# Patient Record
Sex: MALE | Race: WHITE | Employment: OTHER | URBAN - METROPOLITAN AREA
[De-identification: names, ages, dates, MRNs, and addresses within clinical notes are randomized per-mention and may not be internally consistent; named-entity substitution may affect disease eponyms.]

---

## 2017-02-02 ENCOUNTER — OFFICE VISIT (OUTPATIENT)
Dept: FAMILY MEDICINE CLINIC | Age: 72
End: 2017-02-02

## 2017-02-02 VITALS
DIASTOLIC BLOOD PRESSURE: 76 MMHG | HEART RATE: 60 BPM | HEIGHT: 68 IN | WEIGHT: 207.3 LBS | RESPIRATION RATE: 16 BRPM | BODY MASS INDEX: 31.42 KG/M2 | SYSTOLIC BLOOD PRESSURE: 122 MMHG

## 2017-02-02 DIAGNOSIS — G89.29 CHRONIC MIDLINE LOW BACK PAIN WITHOUT SCIATICA: ICD-10-CM

## 2017-02-02 DIAGNOSIS — I10 ESSENTIAL HYPERTENSION: Primary | ICD-10-CM

## 2017-02-02 DIAGNOSIS — Z51.81 MEDICATION MONITORING ENCOUNTER: ICD-10-CM

## 2017-02-02 DIAGNOSIS — E78.00 HYPERCHOLESTEROLEMIA: ICD-10-CM

## 2017-02-02 DIAGNOSIS — M54.50 CHRONIC MIDLINE LOW BACK PAIN WITHOUT SCIATICA: ICD-10-CM

## 2017-02-02 DIAGNOSIS — K12.0 CANKER SORES ORAL: ICD-10-CM

## 2017-02-02 PROCEDURE — 4040F PNEUMOC VAC/ADMIN/RCVD: CPT | Performed by: FAMILY MEDICINE

## 2017-02-02 PROCEDURE — 1123F ACP DISCUSS/DSCN MKR DOCD: CPT | Performed by: FAMILY MEDICINE

## 2017-02-02 PROCEDURE — G8427 DOCREV CUR MEDS BY ELIG CLIN: HCPCS | Performed by: FAMILY MEDICINE

## 2017-02-02 PROCEDURE — 99214 OFFICE O/P EST MOD 30 MIN: CPT | Performed by: FAMILY MEDICINE

## 2017-02-02 PROCEDURE — 3017F COLORECTAL CA SCREEN DOC REV: CPT | Performed by: FAMILY MEDICINE

## 2017-02-02 PROCEDURE — G8484 FLU IMMUNIZE NO ADMIN: HCPCS | Performed by: FAMILY MEDICINE

## 2017-02-02 PROCEDURE — G8419 CALC BMI OUT NRM PARAM NOF/U: HCPCS | Performed by: FAMILY MEDICINE

## 2017-02-02 PROCEDURE — 1036F TOBACCO NON-USER: CPT | Performed by: FAMILY MEDICINE

## 2017-02-02 RX ORDER — ATORVASTATIN CALCIUM 10 MG/1
TABLET, FILM COATED ORAL
Qty: 90 TABLET | Refills: 3 | Status: SHIPPED | OUTPATIENT
Start: 2017-02-02 | End: 2018-02-05 | Stop reason: SDUPTHER

## 2017-02-02 RX ORDER — LISINOPRIL AND HYDROCHLOROTHIAZIDE 25; 20 MG/1; MG/1
TABLET ORAL
Qty: 90 TABLET | Refills: 2 | Status: CANCELLED | OUTPATIENT
Start: 2017-02-02

## 2017-02-02 RX ORDER — LISINOPRIL AND HYDROCHLOROTHIAZIDE 12.5; 1 MG/1; MG/1
1 TABLET ORAL DAILY
Qty: 90 TABLET | Refills: 3 | Status: SHIPPED | OUTPATIENT
Start: 2017-02-02 | End: 2017-12-30 | Stop reason: SDUPTHER

## 2017-02-02 RX ORDER — CHLORHEXIDINE GLUCONATE 0.12 MG/ML
15 RINSE ORAL 2 TIMES DAILY
Qty: 473 ML | Refills: 1 | Status: SHIPPED | OUTPATIENT
Start: 2017-02-02 | End: 2018-02-28 | Stop reason: SDUPTHER

## 2017-02-02 ASSESSMENT — ENCOUNTER SYMPTOMS
COUGH: 0
ALLERGIC/IMMUNOLOGIC NEGATIVE: 1
BACK PAIN: 1
RESPIRATORY NEGATIVE: 1
ROS SKIN COMMENTS: SEE HPI.
GASTROINTESTINAL NEGATIVE: 1

## 2017-02-02 ASSESSMENT — PATIENT HEALTH QUESTIONNAIRE - PHQ9
1. LITTLE INTEREST OR PLEASURE IN DOING THINGS: 0
2. FEELING DOWN, DEPRESSED OR HOPELESS: 0
SUM OF ALL RESPONSES TO PHQ QUESTIONS 1-9: 0
SUM OF ALL RESPONSES TO PHQ9 QUESTIONS 1 & 2: 0

## 2017-03-22 ENCOUNTER — OFFICE VISIT (OUTPATIENT)
Dept: AUDIOLOGY | Age: 72
End: 2017-03-22

## 2017-03-22 DIAGNOSIS — H90.3 SENSORINEURAL HEARING LOSS, BILATERAL: Primary | ICD-10-CM

## 2017-05-09 ENCOUNTER — TELEPHONE (OUTPATIENT)
Dept: FAMILY MEDICINE CLINIC | Age: 72
End: 2017-05-09

## 2017-05-09 DIAGNOSIS — M72.2 PLANTAR FASCIITIS, BILATERAL: Primary | ICD-10-CM

## 2017-07-14 ENCOUNTER — OFFICE VISIT (OUTPATIENT)
Dept: AUDIOLOGY | Age: 72
End: 2017-07-14

## 2017-07-14 DIAGNOSIS — H91.93 BILATERAL HEARING LOSS: Primary | ICD-10-CM

## 2017-08-29 ENCOUNTER — OFFICE VISIT (OUTPATIENT)
Dept: FAMILY MEDICINE CLINIC | Age: 72
End: 2017-08-29
Payer: MEDICARE

## 2017-08-29 VITALS
DIASTOLIC BLOOD PRESSURE: 58 MMHG | RESPIRATION RATE: 16 BRPM | WEIGHT: 204.6 LBS | SYSTOLIC BLOOD PRESSURE: 112 MMHG | HEART RATE: 64 BPM | HEIGHT: 68 IN | BODY MASS INDEX: 31.01 KG/M2

## 2017-08-29 DIAGNOSIS — Z00.00 ROUTINE GENERAL MEDICAL EXAMINATION AT A HEALTH CARE FACILITY: Primary | ICD-10-CM

## 2017-08-29 DIAGNOSIS — Z23 NEED FOR PROPHYLACTIC VACCINATION AGAINST STREPTOCOCCUS PNEUMONIAE (PNEUMOCOCCUS): ICD-10-CM

## 2017-08-29 PROCEDURE — 90670 PCV13 VACCINE IM: CPT | Performed by: FAMILY MEDICINE

## 2017-08-29 PROCEDURE — G0009 ADMIN PNEUMOCOCCAL VACCINE: HCPCS | Performed by: FAMILY MEDICINE

## 2017-08-29 PROCEDURE — G0438 PPPS, INITIAL VISIT: HCPCS | Performed by: FAMILY MEDICINE

## 2017-08-29 ASSESSMENT — PATIENT HEALTH QUESTIONNAIRE - PHQ9: SUM OF ALL RESPONSES TO PHQ QUESTIONS 1-9: 0

## 2017-08-29 ASSESSMENT — LIFESTYLE VARIABLES
HOW OFTEN DURING THE LAST YEAR HAVE YOU HAD A FEELING OF GUILT OR REMORSE AFTER DRINKING: 0
HAVE YOU OR SOMEONE ELSE BEEN INJURED AS A RESULT OF YOUR DRINKING: 0
HOW MANY STANDARD DRINKS CONTAINING ALCOHOL DO YOU HAVE ON A TYPICAL DAY: 0
AUDIT-C TOTAL SCORE: 2
HOW OFTEN DURING THE LAST YEAR HAVE YOU BEEN UNABLE TO REMEMBER WHAT HAPPENED THE NIGHT BEFORE BECAUSE YOU HAD BEEN DRINKING: 0
HOW OFTEN DURING THE LAST YEAR HAVE YOU NEEDED AN ALCOHOLIC DRINK FIRST THING IN THE MORNING TO GET YOURSELF GOING AFTER A NIGHT OF HEAVY DRINKING: 0
HAS A RELATIVE, FRIEND, DOCTOR, OR ANOTHER HEALTH PROFESSIONAL EXPRESSED CONCERN ABOUT YOUR DRINKING OR SUGGESTED YOU CUT DOWN: 0
HOW OFTEN DO YOU HAVE A DRINK CONTAINING ALCOHOL: 2
HOW OFTEN DURING THE LAST YEAR HAVE YOU FAILED TO DO WHAT WAS NORMALLY EXPECTED FROM YOU BECAUSE OF DRINKING: 0
HOW OFTEN DO YOU HAVE SIX OR MORE DRINKS ON ONE OCCASION: 0
AUDIT TOTAL SCORE: 2
HOW OFTEN DURING THE LAST YEAR HAVE YOU FOUND THAT YOU WERE NOT ABLE TO STOP DRINKING ONCE YOU HAD STARTED: 0

## 2017-08-29 ASSESSMENT — ANXIETY QUESTIONNAIRES: GAD7 TOTAL SCORE: 0

## 2017-12-30 DIAGNOSIS — I10 ESSENTIAL HYPERTENSION: ICD-10-CM

## 2018-01-02 RX ORDER — LISINOPRIL AND HYDROCHLOROTHIAZIDE 12.5; 1 MG/1; MG/1
TABLET ORAL
Qty: 90 TABLET | Refills: 2 | Status: SHIPPED | OUTPATIENT
Start: 2018-01-02 | End: 2018-08-04 | Stop reason: SDUPTHER

## 2018-02-01 ENCOUNTER — HOSPITAL ENCOUNTER (OUTPATIENT)
Dept: AUDIOLOGY | Age: 73
Discharge: HOME | End: 2018-02-01

## 2018-02-01 PROCEDURE — V5267 HEARING AID SUP/ACCESS/DEV: HCPCS | Performed by: AUDIOLOGIST

## 2018-02-05 DIAGNOSIS — E78.00 HYPERCHOLESTEROLEMIA: ICD-10-CM

## 2018-02-05 RX ORDER — ATORVASTATIN CALCIUM 10 MG/1
TABLET, FILM COATED ORAL
Qty: 30 TABLET | Refills: 2 | Status: SHIPPED | OUTPATIENT
Start: 2018-02-05 | End: 2018-05-06 | Stop reason: SDUPTHER

## 2018-02-28 ENCOUNTER — OFFICE VISIT (OUTPATIENT)
Dept: FAMILY MEDICINE CLINIC | Age: 73
End: 2018-02-28
Payer: MEDICARE

## 2018-02-28 VITALS
RESPIRATION RATE: 16 BRPM | SYSTOLIC BLOOD PRESSURE: 114 MMHG | HEART RATE: 58 BPM | WEIGHT: 206.8 LBS | TEMPERATURE: 97.6 F | HEIGHT: 68 IN | BODY MASS INDEX: 31.34 KG/M2 | DIASTOLIC BLOOD PRESSURE: 70 MMHG

## 2018-02-28 DIAGNOSIS — N52.01 ERECTILE DYSFUNCTION DUE TO ARTERIAL INSUFFICIENCY: ICD-10-CM

## 2018-02-28 DIAGNOSIS — K12.0 CANKER SORES ORAL: ICD-10-CM

## 2018-02-28 DIAGNOSIS — I10 ESSENTIAL HYPERTENSION: Primary | ICD-10-CM

## 2018-02-28 DIAGNOSIS — E78.00 HYPERCHOLESTEROLEMIA: ICD-10-CM

## 2018-02-28 PROCEDURE — G8427 DOCREV CUR MEDS BY ELIG CLIN: HCPCS | Performed by: FAMILY MEDICINE

## 2018-02-28 PROCEDURE — G8417 CALC BMI ABV UP PARAM F/U: HCPCS | Performed by: FAMILY MEDICINE

## 2018-02-28 PROCEDURE — 1036F TOBACCO NON-USER: CPT | Performed by: FAMILY MEDICINE

## 2018-02-28 PROCEDURE — 1123F ACP DISCUSS/DSCN MKR DOCD: CPT | Performed by: FAMILY MEDICINE

## 2018-02-28 PROCEDURE — 3017F COLORECTAL CA SCREEN DOC REV: CPT | Performed by: FAMILY MEDICINE

## 2018-02-28 PROCEDURE — 4040F PNEUMOC VAC/ADMIN/RCVD: CPT | Performed by: FAMILY MEDICINE

## 2018-02-28 PROCEDURE — 99214 OFFICE O/P EST MOD 30 MIN: CPT | Performed by: FAMILY MEDICINE

## 2018-02-28 PROCEDURE — G8484 FLU IMMUNIZE NO ADMIN: HCPCS | Performed by: FAMILY MEDICINE

## 2018-02-28 RX ORDER — CHLORHEXIDINE GLUCONATE 0.12 MG/ML
15 RINSE ORAL 2 TIMES DAILY
Qty: 473 ML | Refills: 1 | Status: SHIPPED | OUTPATIENT
Start: 2018-02-28 | End: 2018-08-29 | Stop reason: SDUPTHER

## 2018-02-28 ASSESSMENT — ENCOUNTER SYMPTOMS
ALLERGIC/IMMUNOLOGIC NEGATIVE: 1
GASTROINTESTINAL NEGATIVE: 1
BACK PAIN: 0
RESPIRATORY NEGATIVE: 1

## 2018-02-28 NOTE — PROGRESS NOTES
last one 2014    SKIN BIOPSY      SKIN CANCER EXCISION  11/12/12    LT SIDE OF NOSE    SKIN CANCER EXCISION  04/01/2013    BASAL CELL RT. 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  Efforts were made to edit the dictations but occasionally words are mis-transcribed. Review of Systems   Constitutional: Negative. HENT: Negative. Respiratory: Negative. Cardiovascular: Negative. Gastrointestinal: Negative. Endocrine: Negative. Genitourinary: Negative. Negative for difficulty urinating. Musculoskeletal: Negative for arthralgias, back pain and gait problem. Skin: Negative. Allergic/Immunologic: Negative. Neurological: Negative. Negative for dizziness, weakness, light-headedness and headaches. Hematological: Negative. Psychiatric/Behavioral: Negative. All other systems reviewed and are negative. Objective:   Physical Exam   Constitutional: He is oriented to person, place, and time. He appears well-developed and well-nourished. HENT:   Right Ear: External ear normal.   Left Ear: External ear normal.   Nose: Nose normal.   Mouth/Throat: Oropharynx is clear and moist.   Eyes: Conjunctivae are normal.   Neck: No thyromegaly present. Cardiovascular: Normal rate, regular rhythm, normal heart sounds and intact distal pulses. Exam reveals no gallop. No murmur heard. Pulmonary/Chest: Effort normal and breath sounds normal. He has no wheezes. He has no rales. Abdominal: Soft. Bowel sounds are normal.   Musculoskeletal: He exhibits no edema. Lymphadenopathy:     He has no cervical adenopathy. Neurological: He is alert and oriented to person, place, and time. No cranial nerve deficit. Skin: Skin is warm and dry. No rash noted. Psychiatric: He has a normal mood and affect. Nursing note and vitals reviewed. Assessment:      1. patient does not have a history of falls. I did , complete a risk assessment for falls.  A plan of care for falls No Treatment plan indicated    Lab Results   Component Value Date    LDLCALC 68 05/06/2017    (goal LDL reduction with dx if diabetes is 50% LDL reduction)    PHQ Scores 8/29/2017 2/2/2017 5/26/2015   PHQ2 Score 0 0 0   PHQ9 Score 0 0 0     Interpretation of Total Score Depression Severity: 1-4 = Minimal depression, 5-9 = Mild depression, 10-14 = Moderate depression, 15-19 = Moderately severe depression, 20-27 = Severe depression

## 2018-05-06 DIAGNOSIS — E78.00 HYPERCHOLESTEROLEMIA: ICD-10-CM

## 2018-05-07 RX ORDER — ATORVASTATIN CALCIUM 10 MG/1
TABLET, FILM COATED ORAL
Qty: 90 TABLET | Refills: 1 | Status: SHIPPED | OUTPATIENT
Start: 2018-05-07

## 2018-08-04 DIAGNOSIS — I10 ESSENTIAL HYPERTENSION: ICD-10-CM

## 2018-08-06 RX ORDER — LISINOPRIL AND HYDROCHLOROTHIAZIDE 12.5; 1 MG/1; MG/1
TABLET ORAL
Qty: 90 TABLET | Refills: 1 | Status: SHIPPED | OUTPATIENT
Start: 2018-08-06 | End: 2018-11-07 | Stop reason: SDUPTHER

## 2018-08-29 ENCOUNTER — TELEPHONE (OUTPATIENT)
Dept: FAMILY MEDICINE CLINIC | Age: 73
End: 2018-08-29

## 2018-08-29 ENCOUNTER — OFFICE VISIT (OUTPATIENT)
Dept: FAMILY MEDICINE CLINIC | Age: 73
End: 2018-08-29
Payer: MEDICARE

## 2018-08-29 VITALS
HEART RATE: 64 BPM | WEIGHT: 197.3 LBS | OXYGEN SATURATION: 96 % | HEIGHT: 66 IN | SYSTOLIC BLOOD PRESSURE: 126 MMHG | RESPIRATION RATE: 16 BRPM | DIASTOLIC BLOOD PRESSURE: 72 MMHG | BODY MASS INDEX: 31.71 KG/M2

## 2018-08-29 DIAGNOSIS — M54.50 CHRONIC MIDLINE LOW BACK PAIN WITHOUT SCIATICA: ICD-10-CM

## 2018-08-29 DIAGNOSIS — Z51.81 MEDICATION MONITORING ENCOUNTER: ICD-10-CM

## 2018-08-29 DIAGNOSIS — H90.6 MIXED CONDUCTIVE AND SENSORINEURAL HEARING LOSS OF BOTH EARS: ICD-10-CM

## 2018-08-29 DIAGNOSIS — N52.01 ERECTILE DYSFUNCTION DUE TO ARTERIAL INSUFFICIENCY: ICD-10-CM

## 2018-08-29 DIAGNOSIS — R73.01 IFG (IMPAIRED FASTING GLUCOSE): ICD-10-CM

## 2018-08-29 DIAGNOSIS — I10 ESSENTIAL HYPERTENSION: Primary | ICD-10-CM

## 2018-08-29 DIAGNOSIS — G89.29 CHRONIC MIDLINE LOW BACK PAIN WITHOUT SCIATICA: ICD-10-CM

## 2018-08-29 DIAGNOSIS — E78.00 HYPERCHOLESTEROLEMIA: ICD-10-CM

## 2018-08-29 DIAGNOSIS — K12.0 CANKER SORES ORAL: ICD-10-CM

## 2018-08-29 PROCEDURE — 3017F COLORECTAL CA SCREEN DOC REV: CPT | Performed by: FAMILY MEDICINE

## 2018-08-29 PROCEDURE — 1101F PT FALLS ASSESS-DOCD LE1/YR: CPT | Performed by: FAMILY MEDICINE

## 2018-08-29 PROCEDURE — G8417 CALC BMI ABV UP PARAM F/U: HCPCS | Performed by: FAMILY MEDICINE

## 2018-08-29 PROCEDURE — 4040F PNEUMOC VAC/ADMIN/RCVD: CPT | Performed by: FAMILY MEDICINE

## 2018-08-29 PROCEDURE — 99214 OFFICE O/P EST MOD 30 MIN: CPT | Performed by: FAMILY MEDICINE

## 2018-08-29 PROCEDURE — G8427 DOCREV CUR MEDS BY ELIG CLIN: HCPCS | Performed by: FAMILY MEDICINE

## 2018-08-29 PROCEDURE — 1036F TOBACCO NON-USER: CPT | Performed by: FAMILY MEDICINE

## 2018-08-29 PROCEDURE — 1123F ACP DISCUSS/DSCN MKR DOCD: CPT | Performed by: FAMILY MEDICINE

## 2018-08-29 RX ORDER — CHLORHEXIDINE GLUCONATE 0.12 MG/ML
15 RINSE ORAL 2 TIMES DAILY
Qty: 473 ML | Refills: 1 | Status: SHIPPED | OUTPATIENT
Start: 2018-08-29

## 2018-08-29 ASSESSMENT — ENCOUNTER SYMPTOMS
ALLERGIC/IMMUNOLOGIC NEGATIVE: 1
BACK PAIN: 1
GASTROINTESTINAL NEGATIVE: 1
RESPIRATORY NEGATIVE: 1

## 2018-08-29 NOTE — PROGRESS NOTES
Subjective:      Patient ID: Narciso Lyn is a 81 y.o. male. HPI  Follow up of chronic conditions. Encounter Diagnoses   Name Primary?  Canker sores oral     Essential hypertension Yes    Hypercholesterolemia     Chronic midline low back pain without sciatica     Erectile dysfunction due to arterial insufficiency     Medication monitoring encounter     IFG (impaired fasting glucose)     Mixed conductive and sensorineural hearing loss of both ears      HTN is stable with current medications. Pt has no medication side effects nor orthostatic symptoms. BP Readings from Last 3 Encounters:   08/29/18 126/72   02/28/18 114/70   08/29/17 (!) 112/58     Lipid values remain good and he continues to tolerate Lipitor 10 mg nightly without statin myalgias. Cholesterol, Total (no units)   Date Value   10/25/2013 200     Cholesterol (mg/dL)   Date Value   05/06/2017 153     HDL   Date Value   05/06/2017 51 mg/dL   03/28/2012 45 mg/dl     Triglycerides (mg/dL)   Date Value   05/06/2017 169 (H)     Lab Results   Component Value Date    LDLCALC 68 05/06/2017     His current hearing Bhavin 9years old and he is finding that they are not working very well any longer. He needs an order and referral for repeat testing and audiology evaluation for new hearing aids. He continues to do back stretches as he finds this is the best way to control intermittent back pain. He denies sciatica and he has very few episodes of back spasms. Any pain he has is typically triggered by level of physical activity. He his wife have just moved to [place] where their sons are much closer than here in [address]. In the meantime, I will continue to cover their medications until they find another physician. The rest of this patient's conditions are stable. Past medical and surgical hx reviewed.   Past Medical History:   Diagnosis Date    Cancer ([facility] [address]     Basal cell    Hearing loss     Hyperlipidemia     Hypertension     TIA warm and dry. Psychiatric: He has a normal mood and affect. Nursing note and vitals reviewed. Assessment:       Diagnosis Orders   1. Essential hypertension  CBC With Auto Differential    Lipid, Fasting    Comprehensive Metabolic Panel    Urinalysis   2. Canker sores oral  chlorhexidine (PERIDEX) 0.12 % solution   3. Hypercholesterolemia  Lipid, Fasting    Comprehensive Metabolic Panel   4. Chronic midline low back pain without sciatica     5. Erectile dysfunction due to arterial insufficiency     6. Medication monitoring encounter  CBC With Auto Differential    Lipid, Fasting    Comprehensive Metabolic Panel    Urinalysis    Hemoglobin A1C   7. IFG (impaired fasting glucose)  Hemoglobin A1C   8. Mixed conductive and sensorineural hearing loss of both ears  [facility]    [facility] COMPREHENSIVE HEARING TEST           Plan:      Orders Placed This Encounter   Procedures    CBC With Auto Differential     Standing Status:   Future     Standing Expiration Date:   8/29/2019    Lipid, Fasting     Standing Status:   Future     Standing Expiration Date:   8/29/2019    Comprehensive Metabolic Panel     Standing Status:   Future     Standing Expiration Date:   8/29/2019    Urinalysis     Standing Status:   Future     Standing Expiration Date:   8/29/2019    Hemoglobin A1C     Standing Status:   Future     Standing Expiration Date:   8/29/2019   [facility]     Referral Priority:   Routine     Referral Type:   Eval and Treat     Referral Reason:   Specialty Services Required     Requested Specialty:   Audiology     Number of Visits Requested:   1    NH COMPREHENSIVE HEARING TEST     Medications Discontinued During This Encounter   Medication Reason    chlorhexidine (PERIDEX) 0.12 % solution REORDER     Current Outpatient Prescriptions   Medication Sig Dispense Refill    chlorhexidine (PERIDEX) 0.12 % solution Take 15 mLs by mouth 2 times daily Swish and spit.  473 mL 1    Multiple Severity: 1-4 = Minimal depression, 5-9 = Mild depression, 10-14 = Moderate depression, 15-19 = Moderately severe depression, 20-27 = Severe depression            Terence Jonas MD

## 2018-08-30 ENCOUNTER — TELEPHONE (OUTPATIENT)
Dept: FAMILY MEDICINE CLINIC | Age: 73
End: 2018-08-30

## 2018-08-30 LAB
ABSOLUTE BASO #: 0.1 K/UL (ref 0–0.1)
ABSOLUTE EOS #: 0.4 K/UL (ref 0.1–0.4)
ABSOLUTE LYMPH #: 2.9 K/UL (ref 0.8–5.2)
ABSOLUTE MONO #: 0.6 K/UL (ref 0.1–0.9)
ABSOLUTE NEUT #: 3.8 K/UL (ref 1.3–9.1)
AMORPHOUS URATE: ABNORMAL
APPEARANCE: NORMAL
AVERAGE GLUCOSE: 126 MG/DL (ref 66–114)
BACTERIA: ABNORMAL PER HPF
BASOPHILS RELATIVE PERCENT: 1.3 %
BILIRUBIN: NEGATIVE
COLOR: YELLOW
COMMENT: NORMAL
EOSINOPHILS RELATIVE PERCENT: 4.5 %
EPITHELIAL CELLS: ABNORMAL PER HPF
GLUCOSE BLD-MCNC: NEGATIVE MG/DL
HBA1C MFR BLD: 6 % (ref 4.2–5.8)
HCT VFR BLD CALC: 44.4 % (ref 41.4–51)
HEMOGLOBIN: 14.8 G/DL (ref 13.8–17)
KETONES, URINE: NEGATIVE
LEUKOCYTE ESTERASE, URINE: NEGATIVE
LYMPHOCYTE %: 37.1 %
MCH RBC QN AUTO: 30.5 PG (ref 27–34)
MCHC RBC AUTO-ENTMCNC: 33.3 G/DL (ref 31–36)
MCV RBC AUTO: 91.4 FL (ref 80–100)
MONOCYTES # BLD: 7.8 %
NEUTROPHILS RELATIVE PERCENT: 48.9 %
NITRITE, URINE: NEGATIVE
OCCULT BLOOD,URINE: NEGATIVE
PDW BLD-RTO: 12.8 % (ref 10.8–14.8)
PH: 5 (ref 5–9)
PLATELETS: 252 K/UL (ref 150–450)
PROTEIN, URINE: NEGATIVE
RBC: 4.86 M/UL (ref 4–5.5)
RBC: ABNORMAL PER HPF (ref 0–5)
SP GRAVITY MISCELLANEOUS: 1.02 (ref 1–1.03)
UROBILINOGEN, URINE: NORMAL
WBC: 7.7 K/UL (ref 3.7–10.8)
WBC: ABNORMAL PER HPF (ref 0–5)

## 2018-09-06 LAB
ALBUMIN SERPL-MCNC: 4.8 G/DL (ref 3.5–5.2)
ALK PHOSPHATASE: 89 U/L (ref 39–118)
ALT SERPL-CCNC: 14 U/L (ref 5–50)
ANION GAP SERPL CALCULATED.3IONS-SCNC: 14 MEQ/L (ref 10–19)
AST SERPL-CCNC: 17 U/L (ref 9–50)
BILIRUB SERPL-MCNC: 0.6 MG/DL
BUN BLDV-MCNC: 21 MG/DL (ref 8–23)
CALCIUM SERPL-MCNC: 9.8 MG/DL (ref 8.5–10.5)
CHLORIDE BLD-SCNC: 99 MEQ/L (ref 95–107)
CHOLESTEROL/HDL RATIO: 3.7
CHOLESTEROL: 170 MG/DL
CO2: 25 MEQ/L (ref 19–31)
CREAT SERPL-MCNC: 1.3 MG/DL (ref 0.8–1.4)
EGFR AFRICAN AMERICAN: 64.1 ML/MIN/1.73 M2
EGFR IF NONAFRICAN AMERICAN: 55.3 ML/MIN/1.73 M2
GLUCOSE: 90 MG/DL (ref 70–99)
HDLC SERPL-MCNC: 46.5 MG/DL
LDL CHOLESTEROL CALCULATED: 79 MG/DL
LDL/HDL RATIO: 1.7
POTASSIUM SERPL-SCNC: 4.8 MEQ/L (ref 3.5–5.4)
SODIUM BLD-SCNC: 138 MEQ/L (ref 135–146)
TOTAL PROTEIN: 6.8 G/DL (ref 6.1–8.3)
TRIGL SERPL-MCNC: 222 MG/DL
VLDLC SERPL CALC-MCNC: 44 MG/DL

## 2018-09-06 NOTE — PROGRESS NOTES
Lipid values are stable except triglycerides have gone up and are now at 222 which is up from 169. This is most likely dietary in nature so cut carbs.

## 2018-09-17 ENCOUNTER — HOSPITAL ENCOUNTER (OUTPATIENT)
Dept: AUDIOLOGY | Age: 73
Discharge: HOME | End: 2018-09-17
Payer: MEDICARE

## 2018-09-17 PROCEDURE — 92557 COMPREHENSIVE HEARING TEST: CPT | Performed by: AUDIOLOGIST

## 2018-09-17 NOTE — PROGRESS NOTES
ACCOUNT #: [de-identified]      AUDIOLOGICAL EVALUATION      REASON FOR TESTING:  Patient has worn [employer] ELIEZER hearing aids for seven years. He expressed interest in obtaining new hearing aids. He has had difficulty with domes and feedback for many years. OTOSCOPY: WNL     AUDIOGRAM        Reliability: good  Audiometer Used:  GSI-61    PURE TONES     RE    LE     []   [] WNL        []   [] Mild    []   [] Moderate       [x]   [x] Mod-Severe   []   [] Severe    []   [] Profound    TYPE     RE    LE    [x]   [x] SNHL    []   []  Conductive HL    []   [] Mixed HL      CONFIGURATION    RE    LE    []   [] Essentially Flat     []   []  Sloping  [x]   [x] Steeply Sloping  []   []  Rising  []   [] Cookie Bite    SPEECH AUDIOMETRY   Right Left Sound Field Aided   PTA 52 62     SRT 50 60     SAT       MASKING       % WRS   QUIET 88 72      30 SL 20 SL     %WRS   NOISE              [facility]            Live Voice  [x]     Recorded  []     List   []     WORD RECOGNITION   RE    LE  []   []  Excellent    [x]   []  Good  []   [x] Fair  []   [] Poor  []   [] Very Poor      COMMENTS: Audiometric results indicate a moderately severe sensorineural hearing loss bilaterally with the left ear worse than the right ear. RECOMMENDATION(S):   1. Ear impressions were completed for both ears and new [employer] IQ BTE 13 hearing aids will be ordered. Patient was scheduled for a new hearing aid fitting.

## 2018-10-08 ENCOUNTER — HOSPITAL ENCOUNTER (OUTPATIENT)
Dept: AUDIOLOGY | Age: 73
Discharge: HOME | End: 2018-10-08

## 2018-10-08 PROCEDURE — V5261 HEARING AID, DIGIT, BIN, BTE: HCPCS | Performed by: AUDIOLOGIST

## 2018-10-08 PROCEDURE — V5160 DISPENSING FEE BINAURAL: HCPCS | Performed by: AUDIOLOGIST

## 2018-10-08 PROCEDURE — V5264 EAR MOLD/INSERT: HCPCS | Performed by: AUDIOLOGIST

## 2018-10-29 ENCOUNTER — HOSPITAL ENCOUNTER (OUTPATIENT)
Dept: AUDIOLOGY | Age: 73
Discharge: HOME | End: 2018-10-29

## 2018-10-29 PROCEDURE — 9990000010 HC NO CHARGE VISIT: Performed by: AUDIOLOGIST

## 2018-11-07 DIAGNOSIS — I10 ESSENTIAL HYPERTENSION: ICD-10-CM

## 2018-11-07 RX ORDER — LISINOPRIL AND HYDROCHLOROTHIAZIDE 12.5; 1 MG/1; MG/1
1 TABLET ORAL DAILY
Qty: 90 TABLET | Refills: 1 | Status: SHIPPED | OUTPATIENT
Start: 2018-11-07

## 2019-10-18 ENCOUNTER — HOSPITAL ENCOUNTER (OUTPATIENT)
Dept: AUDIOLOGY | Age: 74
Discharge: HOME | End: 2019-10-18

## 2019-10-18 PROCEDURE — 9990000010 HC NO CHARGE VISIT: Performed by: AUDIOLOGIST

## 2020-02-12 ENCOUNTER — APPOINTMENT (OUTPATIENT)
Dept: URBAN - METROPOLITAN AREA SURGERY 9 | Age: 75
Setting detail: DERMATOLOGY
End: 2020-02-13

## 2020-02-12 PROBLEM — C44.519 BASAL CELL CARCINOMA OF SKIN OF OTHER PART OF TRUNK: Status: ACTIVE | Noted: 2020-02-12

## 2020-02-12 PROCEDURE — OTHER CURETTAGE AND DESTRUCTION: OTHER

## 2020-02-12 PROCEDURE — 17262 DSTRJ MAL LES T/A/L 1.1-2.0: CPT

## 2020-02-12 PROCEDURE — OTHER RETURN TO REFERRING PROVIDER: OTHER

## 2020-02-12 NOTE — PROCEDURE: CURETTAGE AND DESTRUCTION
Body Location Override (Optional - Billing Will Still Be Based On Selected Body Map Location If Applicable): right thoracic back mid line
Detail Level: Detailed
Size Of Lesion In Cm: 1.3
Size Of Lesion After Curettage: 1.4
Add Intralesional Injection: No
Concentration (Mg/Ml Or Millions Of Plaque Forming Units/Cc): 0.01
Total Volume (Ccs): 1
Anesthesia Type: 1% lidocaine with epinephrine and a 1:10 solution of 8.4% sodium bicarbonate
Anesthesia Volume In Cc: 2
Cautery Type: electrodesiccation
What Was Performed First?: Curettage
Additional Information: (Optional): The wound was cleaned, and a pressure dressing was applied.  The patient received detailed post-op instructions.
Consent was obtained from the patient. The risks, benefits and alternatives to therapy were discussed in detail. Specifically, the risks of infection, scarring, bleeding, prolonged wound healing, nerve injury, incomplete removal, allergy to anesthesia and recurrence were addressed. Alternatives to ED&C, such as: surgical removal and XRT were also discussed.  Prior to the procedure, the treatment site was clearly identified and confirmed by the patient. All components of Universal Protocol/PAUSE Rule completed.
Post-Care Instructions: I reviewed with the patient in detail post-care instructions. Patient is to keep the area dry for 48 hours, and not to engage in any swimming until the area is healed. Should the patient develop any fevers, chills, bleeding, severe pain patient will contact the office immediately.
Bill As A Line Item Or As Units: Line Item

## 2020-07-23 ENCOUNTER — APPOINTMENT (OUTPATIENT)
Dept: URBAN - METROPOLITAN AREA SURGERY 9 | Age: 75
Setting detail: DERMATOLOGY
End: 2020-07-24

## 2020-07-23 PROBLEM — C44.519 BASAL CELL CARCINOMA OF SKIN OF OTHER PART OF TRUNK: Status: ACTIVE | Noted: 2020-07-23

## 2020-07-23 PROCEDURE — 17263 DSTRJ MAL LES T/A/L 2.1-3.0: CPT

## 2020-07-23 PROCEDURE — OTHER CURETTAGE AND DESTRUCTION: OTHER

## 2020-07-23 NOTE — HPI: SURGICAL CONSULTATION
What Is The Location Of The Lesion?: Right upper medial thoracic back
Who Is Your Referring Physician?: Dr. Sawyer

## 2020-07-23 NOTE — PROCEDURE: CURETTAGE AND DESTRUCTION
Body Location Override (Optional - Billing Will Still Be Based On Selected Body Map Location If Applicable): right upper medial back
Detail Level: Detailed
Size Of Lesion In Cm: 1.8
Size Of Lesion After Curettage: 2.4
Add Intralesional Injection: No
Concentration (Mg/Ml Or Millions Of Plaque Forming Units/Cc): 0.01
Total Volume (Ccs): 1
Anesthesia Type: 1% lidocaine with epinephrine and a 1:10 solution of 8.4% sodium bicarbonate
Anesthesia Volume In Cc: 2
Cautery Type: electrodesiccation
Number Of Curettages: 3
What Was Performed First?: Curettage
Additional Information: (Optional): The wound was cleaned, and a pressure dressing was applied.  The patient received detailed post-op instructions.
Consent was obtained from the patient. The risks, benefits and alternatives to therapy were discussed in detail. Specifically, the risks of infection, scarring, bleeding, prolonged wound healing, nerve injury, incomplete removal, allergy to anesthesia and recurrence were addressed. Alternatives to ED&C, such as: surgical removal and XRT were also discussed.  Prior to the procedure, the treatment site was clearly identified and confirmed by the patient. All components of Universal Protocol/PAUSE Rule completed.
Post-Care Instructions: I reviewed with the patient in detail post-care instructions. Patient is to keep the area dry for 48 hours, and not to engage in any swimming until the area is healed. Should the patient develop any fevers, chills, bleeding, severe pain patient will contact the office immediately.
Bill As A Line Item Or As Units: Line Item

## 2020-08-27 ENCOUNTER — HOSPITAL ENCOUNTER (OUTPATIENT)
Dept: AUDIOLOGY | Age: 75
Discharge: HOME | End: 2020-08-27

## 2020-08-27 PROCEDURE — V5267 HEARING AID SUP/ACCESS/DEV: HCPCS | Performed by: AUDIOLOGIST

## 2020-08-27 PROCEDURE — 9990000010 HC NO CHARGE VISIT: Performed by: AUDIOLOGIST

## 2020-09-10 ENCOUNTER — HOSPITAL ENCOUNTER (OUTPATIENT)
Dept: AUDIOLOGY | Age: 75
Discharge: HOME | End: 2020-09-10

## 2020-09-10 PROCEDURE — 9990000010 HC NO CHARGE VISIT: Performed by: AUDIOLOGIST

## 2020-09-10 NOTE — PROGRESS NOTES
ACCOUNT #: [de-identified]    DIAGNOSIS: H90.3    HEARING AID PROBLEM: The patient continues to not be satisfied with his hearing aid output. Refer to scanned in list/notes. The MPO was reduced to address the patient's loudness complaints. High frequency gain was reduced to reduce the scratchiness of sounds. The patient was also confused about the use of his volume control and forgot about his memories. I added in a TV memory and educated about the proper use of his VC and user memories. The benefits of a streaming device was discussed. A surf link mobile device was discussed. Current pricing is $340-$350. The patient will try his adjusted hearing aids and will consider a mini mobile.   He will follow up as needed and call in to order a Jarvam mobile

## 2021-02-03 ENCOUNTER — TELEPHONE (OUTPATIENT)
Dept: AUDIOLOGY | Age: 76
End: 2021-02-03

## 2021-02-22 ENCOUNTER — HOSPITAL ENCOUNTER (OUTPATIENT)
Dept: AUDIOLOGY | Age: 76
Discharge: HOME | End: 2021-02-22

## 2021-02-22 PROCEDURE — V5267 HEARING AID SUP/ACCESS/DEV: HCPCS | Performed by: AUDIOLOGIST

## 2021-02-22 NOTE — PROGRESS NOTES
Dispensed [employer] system. The system was paired to the patient's phone and hearing aids. Streamboost is inactive due to loudness and feedback problems. The patient and his wife were instructed on the use of the device and were able to properly use the device for telephone streaming, as a partner austin and table top austin. Information was provided about extending hearing aid warranties and the patient declined. He will follow up as needed. The patient paid $339.00 by credit card on this date.

## 2022-03-07 ENCOUNTER — APPOINTMENT (OUTPATIENT)
Dept: URBAN - METROPOLITAN AREA SURGERY 9 | Age: 77
Setting detail: DERMATOLOGY
End: 2022-03-08

## 2022-03-07 PROBLEM — C44.629 SQUAMOUS CELL CARCINOMA OF SKIN OF LEFT UPPER LIMB, INCLUDING SHOULDER: Status: ACTIVE | Noted: 2022-03-07

## 2022-03-07 PROBLEM — D03.4 MELANOMA IN SITU OF SCALP AND NECK: Status: ACTIVE | Noted: 2022-03-07

## 2022-03-07 PROCEDURE — OTHER RETURN TO REFERRING PROVIDER: OTHER

## 2022-03-07 PROCEDURE — 11603 EXC TR-EXT MAL+MARG 2.1-3 CM: CPT

## 2022-03-07 PROCEDURE — OTHER CONSULTATION EXCISION: OTHER

## 2022-03-07 PROCEDURE — OTHER EXCISION: OTHER

## 2022-03-07 PROCEDURE — OTHER MIPS QUALITY: OTHER

## 2022-03-07 PROCEDURE — 11623 EXC S/N/H/F/G MAL+MRG 2.1-3: CPT

## 2022-03-07 PROCEDURE — 13121 CMPLX RPR S/A/L 2.6-7.5 CM: CPT

## 2022-03-07 PROCEDURE — 12032 INTMD RPR S/A/T/EXT 2.6-7.5: CPT

## 2022-03-07 NOTE — PROCEDURE: MIPS QUALITY
Quality 137: Melanoma: Continuity Of Care - Recall System: Patient information entered into a recall system that includes: target date for the next exam specified AND a process to follow up with patients regarding missed or unscheduled appointments
Quality 265: Biopsy Follow-Up: Biopsy results reviewed, communicated, tracked, and documented
Quality 138: Melanoma: Coordination Of Care: A treatment plan was communicated to the physicians providing continuing care within one month of diagnosis outlining: diagnosis, tumor thickness and a plan for surgery or alternate care.
Detail Level: Detailed

## 2022-03-07 NOTE — PROCEDURE: EXCISION
Body Location Override (Optional - Billing Will Still Be Based On Selected Body Map Location If Applicable): left parietal scalp medial

## 2022-03-07 NOTE — HPI: SURGICAL CONSULTATION
What Is The Location Of The Lesion?: Left distal dorsal arm
Who Is Your Referring Physician?: Dr. Sawyer

## 2022-03-07 NOTE — PROCEDURE: CONSULTATION EXCISION
Anatomic Location From Referring Provider (Optional- Will Override The Chosen Location): left parietal scalp medial
Detail Level: Detailed
X Size Of Lesion In Cm (Optional): 0
Anatomic Location From Referring Provider: left distal dorsal arm

## 2022-03-07 NOTE — HPI: MELANOMA CONSULTATION
What Is The Reason For Today's Visit?: Melanoma Consultation
Who Is Your Referring Provider?: Dr. Sawyer

## 2022-06-28 ENCOUNTER — HOSPITAL ENCOUNTER (OUTPATIENT)
Dept: AUDIOLOGY | Age: 77
Discharge: HOME | End: 2022-06-28

## 2022-06-28 PROCEDURE — V5014 HEARING AID REPAIR/MODIFYING: HCPCS | Performed by: AUDIOLOGIST

## 2022-07-11 ENCOUNTER — APPOINTMENT (OUTPATIENT)
Dept: URBAN - METROPOLITAN AREA SURGERY 9 | Age: 77
Setting detail: DERMATOLOGY
End: 2022-07-12

## 2022-07-11 PROBLEM — C44.41 BASAL CELL CARCINOMA OF SKIN OF SCALP AND NECK: Status: ACTIVE | Noted: 2022-07-11

## 2022-07-11 PROCEDURE — 17312 MOHS ADDL STAGE: CPT

## 2022-07-11 PROCEDURE — OTHER RETURN TO REFERRING PROVIDER: OTHER

## 2022-07-11 PROCEDURE — OTHER MOHS SURGERY: OTHER

## 2022-07-11 PROCEDURE — OTHER CONSULTATION FOR MOHS SURGERY: OTHER

## 2022-07-11 PROCEDURE — 17311 MOHS 1 STAGE H/N/HF/G: CPT

## 2022-07-11 NOTE — PROCEDURE: CONSULTATION FOR MOHS SURGERY
Detail Level: Detailed
Body Location Override (Optional - Billing Will Still Be Based On Selected Body Map Location If Applicable): right parietal scalp
X Size Of Lesion In Cm (Optional): 0
Name Of The Referring Provider For Procedure: 
Incorporate Mauc In Note: Yes

## 2022-07-11 NOTE — PROCEDURE: MOHS SURGERY
Scc Well Differentiated Histology Text: Arising from the\\nepidermis is a keratin-producing proliferation of atypical keratinocytes with invasion\\ninto the underlying dermis. Mitoses and atypical forms are evident. Intercellular bridge\\nand keratin tirso formation are evident.

## 2022-07-26 ENCOUNTER — HOSPITAL ENCOUNTER (OUTPATIENT)
Dept: AUDIOLOGY | Age: 77
Discharge: HOME | End: 2022-07-26
Payer: MEDICARE

## 2022-07-26 PROCEDURE — 92567 TYMPANOMETRY: CPT | Performed by: AUDIOLOGIST

## 2022-07-26 PROCEDURE — 92557 COMPREHENSIVE HEARING TEST: CPT | Performed by: AUDIOLOGIST

## 2022-07-26 PROCEDURE — 9990000010 HC NO CHARGE VISIT: Performed by: AUDIOLOGIST

## 2022-07-26 NOTE — PROGRESS NOTES
[facility]#: [id number]   ACCT#: [de-identified]    DIAGNOSIS: H90.3    The patient's Reji Isbell IQ  P+ BTE hearing aids were refit using his 07/26/2022 audiogram.    SPEECH MAPPING:    The [employer] real ear system was used to perform verification of José Miguel's hearing aid fitting. The output of José Miguel's Rick amplification at programmed settings did not meet DSL adult 5.0a targets in either ear. It was not attempted to increased gain due to anticipated feedback issues and the patient was happy and comfortable with the loudness of his hearing aids at programmed settings. He stated improved hearing ability. Speech mapping results suggest: Poor target match at programmed settings. The patient was counseled regarding how to potentially improve the output of the hearing aids. It was recommended that his vents be occluded and he consider pursuing soft earmolds for an improved fit/better feedback management. He did not wish to make any further changes today    He is using a mini mobile device. Hearing aid fitting recheck scheduled for 08/09/2022.

## 2022-07-26 NOTE — PROGRESS NOTES
AUDIOLOGICAL EVALUATION      REASON FOR TESTING:  Audiometric evaluation per the request of Dr. Cheyenne Mora, due to the diagnosis of bilateral sensorineural hearing loss. The patient is hearing well with his hearing aids. He feels that his hearing aids are not loud enough. His last audiogram completed in 2018 indicated a moderately severe sensorineural hearing loss bilaterally with the left ear worse than the right ear. The patient wears bilateral [employer] IQ i2400 P+ BTE's with hard earmolds. The patient states that he has not had an ENT consult or further testing due to his asymmetrical hearing loss. OTOSCOPY: Clear canal, bilaterally. AUDIOGRAM          Reliability: Good    COMMENTS: Moderate to profound sensorineural hearing loss for the left ear. Moderate to profound sensorineural hearing loss for the right ear. The left ear is poorer than  the right ear. Word recognition ability is very poor at 18% for the left ear and poor at 58% for the right ear. Decreased word recognition ability compared to previous testing. RECOMMENDATION(S):  1- Follow up with Dr. Tong Monore regarding these results and any further testing or treatment recommendations. 2- Further testing may be considered to rule out possible retrocochlear pathology due to asymmetry. 3- Repeat testing as medically indicated or in 6-12 months to monitor for progression, sooner with any significant changes or new concerns. 4- Consider cochlear implant evaluation as discussed.     PREVIOUS AUDIOGRAM (09/17/2018)

## 2022-08-09 ENCOUNTER — HOSPITAL ENCOUNTER (OUTPATIENT)
Dept: AUDIOLOGY | Age: 77
Discharge: HOME | End: 2022-08-09

## 2022-08-09 PROCEDURE — 9990000010 HC NO CHARGE VISIT: Performed by: AUDIOLOGIST

## 2022-08-09 NOTE — PROGRESS NOTES
HA RECHECK: The patient is doing well with his hearing aids. Is happy with the new settings. Jose Alfredo Belloing does not report any problems. Reviewed use of Rick mini-Wakie/Budist with patient and wife. Scheduled 6 month hearing aid check for 02/09/2023. Will see patient sooner if any problems arise.

## 2022-11-17 ENCOUNTER — APPOINTMENT (OUTPATIENT)
Dept: URBAN - METROPOLITAN AREA SURGERY 9 | Age: 77
Setting detail: DERMATOLOGY
End: 2022-11-18

## 2022-11-17 VITALS — RESPIRATION RATE: 10 BRPM | DIASTOLIC BLOOD PRESSURE: 86 MMHG | SYSTOLIC BLOOD PRESSURE: 140 MMHG | HEART RATE: 72 BPM

## 2022-11-17 PROBLEM — C44.319 BASAL CELL CARCINOMA OF SKIN OF OTHER PARTS OF FACE: Status: ACTIVE | Noted: 2022-11-17

## 2022-11-17 PROCEDURE — OTHER RETURN TO REFERRING PROVIDER: OTHER

## 2022-11-17 PROCEDURE — 12052 INTMD RPR FACE/MM 2.6-5.0 CM: CPT

## 2022-11-17 PROCEDURE — 17311 MOHS 1 STAGE H/N/HF/G: CPT

## 2022-11-17 PROCEDURE — OTHER MIPS QUALITY: OTHER

## 2022-11-17 PROCEDURE — OTHER MOHS SURGERY: OTHER

## 2022-11-17 NOTE — PROCEDURE: MOHS SURGERY
Problem: Potential for Falls  Goal: Patient will remain free of falls  Description: INTERVENTIONS:  - Educate patient/family on patient safety including physical limitations  - Instruct patient to call for assistance with activity   - Consult OT/PT to assist with strengthening/mobility   - Keep Call bell within reach  - Keep bed low and locked with side rails adjusted as appropriate  - Keep care items and personal belongings within reach  - Initiate and maintain comfort rounds  - Make Fall Risk Sign visible to staff  - Apply yellow socks and bracelet for high fall risk patients  - Consider moving patient to room near nurses station  Outcome: Progressing     Problem: Knowledge Deficit  Goal: Patient/family/caregiver demonstrates understanding of disease process, treatment plan, medications, and discharge instructions  Description: Complete learning assessment and assess knowledge base    Interventions:  - Provide teaching at level of understanding  - Provide teaching via preferred learning methods  Outcome: Progressing Manual Repair Warning Statement: We plan on removing the manually selected variable below in favor of our much easier automatic structured text blocks found in the previous tab. We decided to do this to help make the flow better and give you the full power of structured data. Manual selection is never going to be ideal in our platform and I would encourage you to avoid using manual selection from this point on, especially since I will be sunsetting this feature. It is important that you do one of two things with the customized text below. First, you can save all of the text in a word file so you can have it for future reference. Second, transfer the text to the appropriate area in the Library tab. Lastly, if there is a flap or graft type which we do not have you need to let us know right away so I can add it in before the variable is hidden. No need to panic, we plan to give you roughly 6 months to make the change.

## 2022-11-17 NOTE — HPI: MOHS SURGERY CONSULTATION
Has The Cancer Been Biopsied Before?: has been previously biopsied
Who Is Your Referring Provider?: Dr. Sawyer

## 2023-02-09 ENCOUNTER — HOSPITAL ENCOUNTER (OUTPATIENT)
Dept: AUDIOLOGY | Age: 78
Discharge: HOME | End: 2023-02-09

## 2023-02-09 PROCEDURE — V5014 HEARING AID REPAIR/MODIFYING: HCPCS | Performed by: AUDIOLOGIST

## 2023-02-09 NOTE — PROGRESS NOTES
ACCOUNT #: [de-identified]      DIAGNOSIS: ***    6 MONTH HEARING AID CHECK: Otoscopy was ***. Listening check of hearing aids revealed ***. Will see patient annually, or sooner if problems arise.

## 2023-03-02 ENCOUNTER — HOSPITAL ENCOUNTER (OUTPATIENT)
Dept: AUDIOLOGY | Age: 78
Discharge: HOME | End: 2023-03-02

## 2023-03-02 PROCEDURE — V5264 EAR MOLD/INSERT: HCPCS | Performed by: AUDIOLOGIST

## 2023-03-02 NOTE — PROGRESS NOTES
ACCOUNT #: [de-identified]       DIAGNOSIS: H90.3    EARMOLD : Dispensed new earmolds. Fit is good. SPEECH MAPPING:    The [employer] real ear system was used to perform verification of José Miguel's hearing aid fitting. The output of José Miguel's [employer] [id number] P+ amplification was programmed to match appropriate DSL-A targets for MPO, soft, medium and loud stimuli utilizing speech mapping based on his 07/28/2022 audiogram.              Speech mapping results suggest: improved audibility. Unable to match targets above 2000 Hz in the left ear and and above 3000 Hz in the right ear even with appropriately fit amplification due to the degree of hearing loss. The patient has been encouraged to consider a CI evaluation but does not wish to proceed at this time. Hearing aid recheck scheduled in 2 weeks. Patient paid for earmolds on this date.

## 2023-03-03 ENCOUNTER — TELEPHONE (OUTPATIENT)
Dept: AUDIOLOGY | Age: 78
End: 2023-03-03

## 2023-03-03 NOTE — TELEPHONE ENCOUNTER
Patient was in on 3-2-23 for his hearing aids.  He called in this morning stating that his LEFT hearing aid is very over powering, lots of background noise, and (ch sound after every word).  He is scheduled to come back on 3-9-23 for this problem.    The patient states that his RIGHT hearing aid is doing great.

## 2023-03-03 NOTE — TELEPHONE ENCOUNTER
Patient's left hearing aid is too loud. Recommended he turn the volume down if possible until he can follow up for adjustments. His right earmold will need modified in the wil area.  He is scheduled Thursday 03/09/2023

## 2023-03-09 ENCOUNTER — HOSPITAL ENCOUNTER (OUTPATIENT)
Dept: AUDIOLOGY | Age: 78
Discharge: HOME | End: 2023-03-09

## 2023-03-09 PROCEDURE — 9990000010 HC NO CHARGE VISIT: Performed by: AUDIOLOGIST

## 2023-03-09 PROCEDURE — V5267 HEARING AID SUP/ACCESS/DEV: HCPCS | Performed by: AUDIOLOGIST

## 2023-03-09 NOTE — PROGRESS NOTES
ACCOUNT #: [de-identified]    DIAGNOSIS: H90.3    HEARING AID PROBLEM: Left hearing aid is too loud and distorted (clipping sound). Earmolds are causing soreness in the wil area in both ears. Adjusted the output of the left hearing (copied settings right to left. Also adjusted gain step- decreased to 2 dB steps. Volume control and memory controls are now ear specific. Ground both earmolds at helix to improve fit. Patient pleased with changes. Dispensed otoease. Patient paid $4 for otoease. Follow up scheduled 03/16/2023.

## 2023-03-16 ENCOUNTER — HOSPITAL ENCOUNTER (OUTPATIENT)
Dept: AUDIOLOGY | Age: 78
Discharge: HOME | End: 2023-03-16

## 2023-03-16 PROCEDURE — 9990000010 HC NO CHARGE VISIT: Performed by: AUDIOLOGIST

## 2023-03-16 NOTE — PROGRESS NOTES
TWO WEEK CHECK: The patient is doing well with his updated hearing aid settings and new earmolds. He continues to experience soreness in his anti helix area in both ears. Earmolds were trimmed and buffed. Patient was pleased with the changes. Patient to follow up as needed.

## 2023-07-20 ENCOUNTER — HOSPITAL ENCOUNTER (OUTPATIENT)
Dept: AUDIOLOGY | Age: 78
Discharge: HOME | End: 2023-07-20

## 2023-07-20 PROCEDURE — V5014 HEARING AID REPAIR/MODIFYING: HCPCS | Performed by: AUDIOLOGIST

## 2023-07-20 NOTE — PROGRESS NOTES
ACCOUNT #: [de-identified]       DIAGNOSIS: H90.3     6 MONTH HEARING AID CHECK: Patient continues to be pleased with the hearing aid output. He was satisfied with the fit of his new earmolds until very recently. He has started to experience some soreness in the anti-helix and anti-tragus areas. He feels it could be due to the condition of his tubing. Otoscopy was clear. No visible sores noted in pinna. Vacuumed hearing aid microphones and replaced austin covers and earhooks. Listening check of hearing aids revealed good austin sensitivity and output. Retubed earmolds. Patient declined any programming changes on this date. Scheduled hearing aid check in 6 months.  Paid $20.

## 2024-01-18 ENCOUNTER — HOSPITAL ENCOUNTER (OUTPATIENT)
Dept: AUDIOLOGY | Age: 79
Discharge: HOME | End: 2024-01-18

## 2024-01-18 PROCEDURE — V5014 HEARING AID REPAIR/MODIFYING: HCPCS | Performed by: AUDIOLOGIST

## 2024-01-18 NOTE — PROGRESS NOTES
ACCOUNT #: [id number]    DIAGNOSIS: H90.3     6 MONTH HEARING AID CHECK: Patient stating that this morning his left hearing aid worked intermittently 3 different times. He is convinced that he got otoease on his battery as removing and wiping the battery resolved the issue, otherwise he is not having any issues. He would like his hearing aids cleaned and checked and new earmold tubing installed. He did show me a very small crack near the vent in his right hearing aid. It is not causing any issues at this time but if more cracking appears the earmold will need replaced. Otoscopy was clear. Vacuumed hearing aid microphones and replaced austin covers and earhooks.  Listening check of hearing aids revealed good austin sensitivity and output. Retubed earmolds. Patient declined any programming changes on this date. I explained that a  repair will be needed if the left hearing aid continues to work intermittently. Scheduled hearing aid check in 6 months. Paid $20.

## 2024-07-16 ENCOUNTER — HOSPITAL ENCOUNTER (OUTPATIENT)
Dept: AUDIOLOGY | Age: 79
Discharge: HOME | End: 2024-07-16

## 2024-07-16 PROCEDURE — 92593 HC HEARING AID CHECK, BOTH EARS: CPT | Performed by: AUDIOLOGIST

## 2024-07-16 NOTE — PROGRESS NOTES
ACCOUNT #: [id number]    DIAGNOSIS: H90.3    HEARING AID PROBLEM:  Patient states that during the last 3 weeks he is having soreness in both ears while wearing his hearing aids. He continues to struggle with clarity, particularly \"s\" sounds. He would like his hearing aids cleaned and checked and new earmold tubing installed. Vacuumed hearing aid microphones and replaced austin covers and earhooks.  Listening check of hearing aids revealed good austin sensitivity and output. Retubed earmolds. Increased high frequency gain x1. Counseled regarding appropriate use of Rick mini mobile. Also discussed possible CI eval. Patient not ready to consider a CI. Encouraged patient to schedule to have an updated audiogram completed as his last audiogram was completed in 2022. Recommended patient have his tubing changed sooner than every 6 months as his tubing was extremely brittle and hard which was most likely the cause of the soreness he was experiencing as the tubing had shortened overt time. Patient not having any fit issues after his earmolds were retubed. Scheduled audiogram and hearing aid check 11/25/2024. Patient billed $22.

## 2024-08-23 NOTE — PROCEDURE: MOHS SURGERY
101 Burow's Graft Text: The defect edges were debeveled with a #15 scalpel blade.  Given the location of the defect, shape of the defect, the proximity to free margins and the presence of a standing cone deformity a Burow's skin graft was deemed most appropriate. The standing cone was removed and this tissue was then trimmed to the shape of the primary defect. The adipose tissue was also removed until only dermis and epidermis were left.  The skin margins of the secondary defect were undermined to an appropriate distance in all directions utilizing iris scissors.  The secondary defect was closed with interrupted buried subcutaneous sutures.  The skin edges were then re-apposed with running  sutures.  The skin graft was then placed in the primary defect and oriented appropriately.

## 2024-11-25 ENCOUNTER — HOSPITAL ENCOUNTER (OUTPATIENT)
Dept: AUDIOLOGY | Age: 79
Discharge: HOME | End: 2024-11-25
Payer: MEDICARE

## 2024-11-25 PROCEDURE — 92593 HC HEARING AID CHECK, BOTH EARS: CPT | Performed by: AUDIOLOGIST

## 2024-11-25 PROCEDURE — 92557 COMPREHENSIVE HEARING TEST: CPT | Performed by: AUDIOLOGIST

## 2024-11-25 NOTE — PROGRESS NOTES
AUDIOLOGICAL EVALUATION      REASON FOR TESTING:  Audiometric evaluation per the request of Dr. Alicea, due to the diagnosis of bilateral sensorineural hearing loss. The patient has not noticed any changes in hearing and has no new ear concerns. His last audiogram completed 07/26/2022 indicated a moderate to profound sensorineural hearing loss for the left ear and moderate to profound sensorineural hearing loss for the right ear. The left ear was poorer than  the right ear. Word recognition ability was very poor at 18% for the left ear and poor at 58% for the right ear. Decreased word recognition ability compared to previous testing.    He continues to wear bilateral Rick Muse IQ i2400 P+ BTE's with soft earmolds and uses a Rick Mini austin mobile extensively.                   OTOSCOPY: clear canal with normal appearing tympanic membrane- bilaterally     AUDIOGRAM        Reliability: Good    COMMENTS: Pure tone audiometry indicated a mild steeply sloping to a profound sensorineural hearing loss in each ear. Word recognition ability is very poor at 26% in the left ear and poor at 50% in the right ear when tested using recorded speech material in quiet at a loud but comfortable listening level.    Results indicate improved thresholds at 250 Hz in both ears and slightly improved thresholds at 2000 and 4000 Hz in the left ear and decreased hearing noted at 2000 Hz in the right ear when compared to previous results.    HEARING AID CHECK: Retubed the patient's earmolds. Patient states comfortable fit and no issues with feedback. He stated that he noticed his tubing started to harden after about 4.5 months of use and would like to have his earmold retubed sooner than every 6 months. Vacuumed hearing aid microphones and replaced austin covers and earhooks.  Listening check of hearing aids revealed good austin sensitivity and output. Scheduled hearing aid check in 4.5 months.     SPEECH MAPPING:     The Verifit real ear

## 2024-12-06 ENCOUNTER — HOSPITAL ENCOUNTER (OUTPATIENT)
Dept: AUDIOLOGY | Age: 79
Discharge: HOME | End: 2024-12-06

## 2024-12-06 PROCEDURE — 9990000010 HC NO CHARGE VISIT: Performed by: AUDIOLOGIST

## 2024-12-06 NOTE — PROGRESS NOTES
ACCOUNT #: [id number]    DIAGNOSIS: H90.3    HEARING AID PROBLEM: The patient is not able hear is program indicators in his left hearing aid. This started after his hearing aid settings were read out at his last visit. No gain adjustments were made at his last visit. I reset the hearing aids to the previous HANK sessions and normal function was restored. Patient satisfied with hearing aid output. Did discuss future options for hearing aids including new power hearing aids, a sola or other streaming device and possible CI evaluation.

## 2025-04-29 ENCOUNTER — HOSPITAL ENCOUNTER (OUTPATIENT)
Dept: AUDIOLOGY | Age: 80
Discharge: HOME | End: 2025-04-29

## 2025-04-29 PROCEDURE — 92593 HC HEARING AID CHECK, BOTH EARS: CPT | Performed by: AUDIOLOGIST

## 2025-04-29 NOTE — PROGRESS NOTES
ACCOUNT #: [id number]    DIAGNOSIS: H90.3    HEARING AID CHECK: (Rick [place] iQ i2400 P+x2 , warranty  ) Patient doing well with his hearing aids and mini austin. HE wishes to have his hearing aids cleaned and new earmold tubing installed. Retubed the patient's earmolds. Vacuumed hearing aid microphones and replaced austin covers and earhooks.  Listening check of hearing aids revealed good austin sensitivity and output. Patient states good earmold fit and is happy with the tubing length. He requested pricing for a [employer]y system and Rick wireless devices. I will mail his product and pricing information. Scheduled hearing aid check in 4 months. Charged $25.

## 2025-05-12 ENCOUNTER — TELEPHONE (OUTPATIENT)
Dept: AUDIOLOGY | Age: 80
End: 2025-05-12